# Patient Record
Sex: FEMALE | Race: WHITE | NOT HISPANIC OR LATINO | Employment: OTHER | URBAN - METROPOLITAN AREA
[De-identification: names, ages, dates, MRNs, and addresses within clinical notes are randomized per-mention and may not be internally consistent; named-entity substitution may affect disease eponyms.]

---

## 2023-10-30 DIAGNOSIS — M06.9 RHEUMATOID ARTHRITIS INVOLVING MULTIPLE SITES, UNSPECIFIED WHETHER RHEUMATOID FACTOR PRESENT (MULTI): Primary | ICD-10-CM

## 2023-10-30 RX ORDER — HYDROXYCHLOROQUINE SULFATE 200 MG/1
200 TABLET, FILM COATED ORAL DAILY
Qty: 90 TABLET | Refills: 2 | Status: SHIPPED | OUTPATIENT
Start: 2023-10-30 | End: 2023-11-20

## 2023-10-30 RX ORDER — HYDROXYCHLOROQUINE SULFATE 200 MG/1
1 TABLET, FILM COATED ORAL DAILY
COMMUNITY
End: 2023-10-30 | Stop reason: SDUPTHER

## 2023-11-19 DIAGNOSIS — M06.9 RHEUMATOID ARTHRITIS INVOLVING MULTIPLE SITES, UNSPECIFIED WHETHER RHEUMATOID FACTOR PRESENT (MULTI): ICD-10-CM

## 2023-11-20 RX ORDER — HYDROXYCHLOROQUINE SULFATE 200 MG/1
TABLET, FILM COATED ORAL DAILY
Qty: 90 TABLET | Refills: 3 | Status: SHIPPED | OUTPATIENT
Start: 2023-11-20 | End: 2024-05-14

## 2024-01-03 ENCOUNTER — TELEPHONE (OUTPATIENT)
Dept: RHEUMATOLOGY | Facility: CLINIC | Age: 74
End: 2024-01-03
Payer: MEDICARE

## 2024-01-03 NOTE — TELEPHONE ENCOUNTER
Patient called and would like you to call her about her medications, I offered her a virtual appointment and she declined it.

## 2024-01-06 PROBLEM — M81.0 OSTEOPOROSIS: Status: ACTIVE | Noted: 2024-01-06

## 2024-01-06 PROBLEM — M05.79: Status: ACTIVE | Noted: 2024-01-06

## 2024-01-06 PROBLEM — Z79.60 LONG-TERM USE OF IMMUNOSUPPRESSANT MEDICATION: Status: ACTIVE | Noted: 2024-01-06

## 2024-01-06 PROBLEM — M25.519 SHOULDER PAIN: Status: ACTIVE | Noted: 2024-01-06

## 2024-01-06 PROBLEM — E55.9 VITAMIN D DEFICIENCY: Status: ACTIVE | Noted: 2024-01-06

## 2024-01-06 NOTE — PROGRESS NOTES
Subjective   Patient ID: Celine Combs is a 73 y.o. female who presents for Rheumatoid Arthritis and Osteoarthritis (FUV- pt would to discuss meds. Taking Plaquenil 200 mg daily. She c/o stiffness on and off. ).    HPI  Last seen Sept 2023  â€¢    s/p hand surgery, wrist replacement  â€¢    s/p right shoulder replacement  â€¢    OA right knee s/p Right TKA '10  â€¢    Vit D def  â€¢    Osteoporosis T score hip -2.8, 2010- intolerant of oral bisphosphonates. Reclast recommended 2010, but she did not have. Treatment again recommended 2015  â€¢    Ovarian Cancer s/p hyst, Chemo Tx, No XRT  â€¢    History of shingles  . TKR R October 2017    L shoulder TSR dr. Calvillo [facility]   Still off and on does not take  All over knee hip   Walks a lot  Stiff only occ     On plaq gets yearly eye exam    Does not want meds for Osteoporosis    PAIN:  SWELLING:   AM GEL:  SIDE EFFECTS OF MED:    LAST LAB  Lab Results   Component Value Date    CRP 0.58 03/31/2022     Labs sept 23 [facility]  TSH 5.5  Cbc pre op normal  Cmp normal     PHYSICAL EXAM  NODES   HEART  LUNGS  ABDOMEN   VASCULAR  NEURO   SKIN  JOINTS chronic deformities throughout from rheumatoid arthritis specifically her hands  There is currently no information documented on the homunculus. Go to the Rheumatology activity and complete the homunculus joint exam.   Assessment/Plan   Diagnoses and all orders for this visit:  Long-term use of immunosuppressant medication  Osteoporosis, unspecified osteoporosis type, unspecified pathological fracture presence  Vitamin D deficiency  Rheumatoid arthritis, seropositive, multiple sites (CMS/[facility])  Shoulder pain, unspecified chronicity, unspecified laterality    This is a periodic follow-up of rheumatoid arthritis  She has complaints of intermittent stiffness in several joints but no evidence for any active synovitis without any swelling excetra  She continues with Plaquenil 200 mg daily  She asked if there is anything for stiffness of her  neck and other parts of her body but I answered known she needs to continue to do some stretching exercises.  She will discuss this with her physical therapist that she had a total shoulder replacement by Dr. Calvillo at [facility] [place].  Her lab work was within normal limits preshoulder replacement in October 2023 she will be visiting her PCP Dr. Uriarte as he wants to see her because her TSH is 5.5.  I will see her back in 1 year for continued follow-up.  Her eye examinations are up-to-date

## 2024-01-08 ENCOUNTER — OFFICE VISIT (OUTPATIENT)
Dept: RHEUMATOLOGY | Facility: CLINIC | Age: 74
End: 2024-01-08
Payer: MEDICARE

## 2024-01-08 VITALS
BODY MASS INDEX: 17.56 KG/M2 | WEIGHT: 96 LBS | HEART RATE: 101 BPM | DIASTOLIC BLOOD PRESSURE: 81 MMHG | SYSTOLIC BLOOD PRESSURE: 130 MMHG

## 2024-01-08 DIAGNOSIS — Z79.60 LONG-TERM USE OF IMMUNOSUPPRESSANT MEDICATION: Primary | ICD-10-CM

## 2024-01-08 DIAGNOSIS — M81.0 OSTEOPOROSIS, UNSPECIFIED OSTEOPOROSIS TYPE, UNSPECIFIED PATHOLOGICAL FRACTURE PRESENCE: ICD-10-CM

## 2024-01-08 DIAGNOSIS — M25.519 SHOULDER PAIN, UNSPECIFIED CHRONICITY, UNSPECIFIED LATERALITY: ICD-10-CM

## 2024-01-08 DIAGNOSIS — M05.79 RHEUMATOID ARTHRITIS, SEROPOSITIVE, MULTIPLE SITES (MULTI): ICD-10-CM

## 2024-01-08 DIAGNOSIS — E55.9 VITAMIN D DEFICIENCY: ICD-10-CM

## 2024-01-08 PROCEDURE — 1036F TOBACCO NON-USER: CPT | Performed by: INTERNAL MEDICINE

## 2024-01-08 PROCEDURE — 99214 OFFICE O/P EST MOD 30 MIN: CPT | Performed by: INTERNAL MEDICINE

## 2024-01-08 PROCEDURE — 1159F MED LIST DOCD IN RCRD: CPT | Performed by: INTERNAL MEDICINE

## 2024-01-08 PROCEDURE — 1126F AMNT PAIN NOTED NONE PRSNT: CPT | Performed by: INTERNAL MEDICINE

## 2024-01-08 RX ORDER — MONTELUKAST SODIUM 10 MG/1
TABLET ORAL
COMMUNITY

## 2024-01-08 RX ORDER — CHOLECALCIFEROL (VITAMIN D3) 1250 MCG
1 TABLET ORAL
COMMUNITY
Start: 2017-02-09 | End: 2024-01-08 | Stop reason: ALTCHOICE

## 2024-01-08 RX ORDER — FOLIC ACID 1 MG/1
1 TABLET ORAL DAILY
COMMUNITY
Start: 2013-09-12 | End: 2024-01-08 | Stop reason: ALTCHOICE

## 2024-01-08 RX ORDER — LEVOTHYROXINE SODIUM 75 UG/1
1 TABLET ORAL DAILY
COMMUNITY
Start: 2020-11-06

## 2024-05-14 DIAGNOSIS — M06.9 RHEUMATOID ARTHRITIS INVOLVING MULTIPLE SITES, UNSPECIFIED WHETHER RHEUMATOID FACTOR PRESENT (MULTI): ICD-10-CM

## 2024-05-14 RX ORDER — HYDROXYCHLOROQUINE SULFATE 200 MG/1
TABLET, FILM COATED ORAL DAILY
Qty: 90 TABLET | Refills: 2 | Status: SHIPPED | OUTPATIENT
Start: 2024-05-14

## 2025-01-05 NOTE — PROGRESS NOTES
Subjective   Patient ID: Celine Combs is a 74 y.o. female who presents for No chief complaint on file..    HPI  Last seen JAN 2024 1 YEAR AGO     â€¢    s/p hand surgery, wrist replacement  â€¢    s/p right shoulder replacement  â€¢    OA right knee s/p Right TKA '10  â€¢    Vit D def  â€¢    Osteoporosis T score hip -2.8, 2010- intolerant of oral bisphosphonates. Reclast recommended 2010, but she did not have. Treatment again recommended 2015  â€¢    Ovarian Cancer s/p hyst, Chemo Tx, No XRT  â€¢    History of shingles  . TKR R October 2017    L shoulder TSR dr. Calvillo [facility]   Still off and on does not take  All over knee hip   Walks a lot  Stiff only occ     Pt helps neck pain  At [facility]     On plaq gets yearly eye exam    Does not want meds for Osteoporosis    PAIN:  SWELLING:   AM GEL:  SIDE EFFECTS OF MED:    LAST LAB  Lab Results   Component Value Date    CRP 0.58 03/31/2022     Labs sept 23 [facility]  TSH 5.5  Cbc pre op normal  Cmp normal     PHYSICAL EXAM  NODES   HEART  LUNGS  ABDOMEN   VASCULAR  NEURO   SKIN  JOINTS chronic deformities throughout from rheumatoid arthritis specifically her hands  There is currently no information documented on the homunculus. Go to the Rheumatology activity and complete the homunculus joint exam.   Assessment/Plan   There are no diagnoses linked to this encounter.    This is a periodic follow-up of rheumatoid arthritis  She has complaints of intermittent stiffness in several joints but no evidence for any active synovitis without any swelling excetra  She continues with Plaquenil 200 mg daily  She asked if there is anything for stiffness of her neck and other parts of her body but I answered known she needs to continue to do some stretching exercises.  She will discuss this with her physical therapist that she had a total shoulder replacement by Dr. Calvillo at [facility].  Her lab work was within normal limits preshoulder replacement in October  2023 she will be visiting her PCP Dr. Uriarte as he wants to see her because her TSH is 5.5.  I will see her back in 1 year for continued follow-up.  Her eye examinations are up-to-date

## 2025-01-08 ENCOUNTER — APPOINTMENT (OUTPATIENT)
Dept: RHEUMATOLOGY | Facility: CLINIC | Age: 75
End: 2025-01-08
Payer: MEDICARE

## 2025-01-08 VITALS
OXYGEN SATURATION: 94 % | SYSTOLIC BLOOD PRESSURE: 119 MMHG | WEIGHT: 96.2 LBS | HEART RATE: 81 BPM | BODY MASS INDEX: 17.05 KG/M2 | HEIGHT: 63 IN | DIASTOLIC BLOOD PRESSURE: 77 MMHG

## 2025-01-08 DIAGNOSIS — E55.9 VITAMIN D DEFICIENCY: ICD-10-CM

## 2025-01-08 DIAGNOSIS — M81.0 OSTEOPOROSIS, UNSPECIFIED OSTEOPOROSIS TYPE, UNSPECIFIED PATHOLOGICAL FRACTURE PRESENCE: ICD-10-CM

## 2025-01-08 DIAGNOSIS — M05.79 RHEUMATOID ARTHRITIS, SEROPOSITIVE, MULTIPLE SITES (MULTI): Primary | ICD-10-CM

## 2025-01-08 PROCEDURE — 1036F TOBACCO NON-USER: CPT | Performed by: INTERNAL MEDICINE

## 2025-01-08 PROCEDURE — 3008F BODY MASS INDEX DOCD: CPT | Performed by: INTERNAL MEDICINE

## 2025-01-08 PROCEDURE — 99214 OFFICE O/P EST MOD 30 MIN: CPT | Performed by: INTERNAL MEDICINE

## 2025-01-08 PROCEDURE — 1159F MED LIST DOCD IN RCRD: CPT | Performed by: INTERNAL MEDICINE

## 2025-05-28 ENCOUNTER — APPOINTMENT (OUTPATIENT)
Dept: DERMATOLOGY | Facility: CLINIC | Age: 75
End: 2025-05-28
Payer: MEDICARE

## 2025-05-28 VITALS — DIASTOLIC BLOOD PRESSURE: 78 MMHG | HEART RATE: 92 BPM | SYSTOLIC BLOOD PRESSURE: 126 MMHG

## 2025-05-28 DIAGNOSIS — C44.320 SQUAMOUS CELL CARCINOMA OF SKIN OF FACE: Primary | ICD-10-CM

## 2025-05-28 PROCEDURE — 99204 OFFICE O/P NEW MOD 45 MIN: CPT | Performed by: DERMATOLOGY

## 2025-05-28 PROCEDURE — 14041 TIS TRNFR F/C/C/M/N/A/G/H/F: CPT | Performed by: DERMATOLOGY

## 2025-05-28 PROCEDURE — 17311 MOHS 1 STAGE H/N/HF/G: CPT | Performed by: DERMATOLOGY

## 2025-05-28 NOTE — PROGRESS NOTES
Mohs Surgery Operative Note    Date of Surgery:  5/28/2025  Surgeon:  August Wills MD  Office Location: [address]  [facility] [address]  Dept: [phone]  Dept Fax: [phone]  Referring Provider: Lesvia Wynn MD  [address] [facility] [address]  [place] [address]      Assessment/Plan   Pre-procedure:   Obtained informed consent: written from patient  The surgical site was identified and confirmed with the patient.     Intra-operative:   Audible time out called at : 8:20AM 05/28/25  by: Sarina Babin MA   Verified patient name, birthdate, site, specimen bottle label & requisition.    The planned procedure(s) was again reviewed with the patient. The risks of bleeding, infection, nerve damage and scarring were reviewed. Written authorization was obtained. The patient identity, surgical site, and planned procedure(s) were verified. The provider acted as both surgeon and pathologist.     SQUAMOUS CELL CARCINOMA OF SKIN  Above Right Medial Eyebrow  Mohs surgery    Consent obtained: written    Universal Protocol:  Procedure explained and questions answered to patient or proxy's satisfaction: Yes    Test results available and properly labeled: Yes    Pathology report reviewed: Yes    External notes reviewed: Yes    Photo or diagram used for site identification: Yes    Site/side marked: Yes    Slide independently reviewed by Mohs surgeon: Yes    Immediately prior to procedure a time out was called: Yes    Patient identity confirmed: verbally with patient  Preparation: Patient was prepped and draped in usual sterile fashion      Anticoagulation:  Is the patient taking prescription anticoagulant and/or aspirin prescribed/recommended by a physician? No    Was the anticoagulation regimen changed prior to Mohs? No      Anesthesia:  Anesthesia method: local infiltration  Local anesthetic: lidocaine 1% WITH epi    Procedure Details:  Case ID Number:  [id number]  Biopsy accession number: (outside path)  Date of biopsy: 3/27/2025  Pre-Op diagnosis: squamous cell carcinoma  Surgical site (from skin exam): Above Right Medial Eyebrow  Pre-operative length (cm): 0.9  Pre-operative width (cm): 0.9  Indications for Mohs surgery: anatomic location where tissue conservation is critical  Previously treated? No      Micrographic Surgery Details:  Post-operative length (cm): 1.1  Post-operative width (cm): 1  Number of Mohs stages: 1    Stage 1     Comments: The patient was brought into the operating room and placed in the procedure chair in the appropriate position.  The area positive by previous biopsy was identified and confirmed with the patient. The area of clinically obvious tumor was debulked using a curette and/or scalpel as needed. An incision was made following the Mohs approach through the skin. The specimen was taken to the lab, divided into 2 piece(s) and appropriately chromacoded and processed.     Tumor features identified on Mohs section: no tumor identified    Depth of defect: subcutaneous fat    Patient tolerance of procedure: tolerated well, no immediate complications    Reconstruction:  Was the defect reconstructed? Yes    Was reconstruction performed by the same Mohs surgeon? Yes    When was reconstruction performed? same day  Type of reconstruction: flap  Type of flap: advancement    Advancement flap type: bilateral double arm  Flap area (cm2): 15  Subcutaneous Layers (Deep Stitches)   Suture size:  5-0  Suture type:  Vicryl  Stitches:  Buried vertical mattress  Fine/surface layer approximation (top stitches)   Epidermal/Superficial suture size:  5-0  Epidermal/Superficial suture type:  Fast-absorbing gut  Stitches: simple running    Hemostasis achieved with: suture, pressure and electrodesiccation  Outcome: patient tolerated procedure well with no complications    Post-procedure details: sterile dressing applied and wound care instructions given     Dressing type: pressure dressing        Advancement Flap:  Due to geometric and functional constraints, a flap reconstruction was performed to reconstruct the defect. To that end, adjacent tissue was incised and carried over to close the defect in the following manner: Advancement flap Using skin marking ink, an advancement flap was designed to repair the defect and minimize functional and cosmetic distortion. Given the size, depth, and location of the defect, the surrounding structures and local tissue laxity, it was felt that an advancement flap was necessary to provide the best restoration of normal anatomy and function of the skin. The risks, benefits and likely outcome of the flap were discussed. The wound edges were refreshed to a 90 degree angle. The flap was cut and undermined extensively at the level of the subcutaneous plane. Standing cutaneous cones were removed using Burow's triangles. The deep tissue was elevated and the flap was advanced into position to close the primary defect using multiple key deep sutures. The remainder of the flap was then affixed with epidermal sutures. The flap measured 4.7 x 3.2 cm2.       The final repair measured 4.7 x 3.2 cm              Wound care was discussed, and the patient was given written post-operative wound care instructions.      The patient will follow up with August Wills MD as needed for any post operative problems or concerns, and will follow up with their primary dermatologist as scheduled.       Sarina ALEJANDRA MA   am scribing for, and in the presence of August Wills MD

## 2025-05-28 NOTE — PROGRESS NOTES
Office Visit Note  Date: 5/28/2025  Surgeon:  August Wills MD  Office Location:  [address]   [address]  [facility] [address]  Dept: [phone]  Dept Fax: [phone]  Referring Provider: Lesvia Wynn MD  [address]  [address]  [place] [address]    Subjective   Celine Combs is a 74 y.o. female who presents for the following: MOHS Surgery (Above Right Medial Eyebrow-SCC)    According to the patient, the lesion has been present for approximately 6 months at the time of diagnosis.  The lesion is not causing symptoms.  The lesion has not been treated previously.    The patient does not have a pacemaker / defibrillator.  The patient does have a heart valve / joint replacement.    The patient is not on blood thinners.  The patient does not have a history of hepatitis B or C.  The patient does not have a history of HIV.  The patient does not have a history of immunosuppression (e.g. organ transplantation, malignancy, medications)    Review of Systems:  No other skin or systemic complaints other than what is documented elsewhere in the note.    MEDICAL HISTORY: clinically relevant history including significant past medical history, medications and allergies was reviewed and documented in Epic.    Objective   Well appearing patient in no apparent distress; mood and affect are within normal limits.  Vital signs: See record.  Noted on the   Above Right Medial Eyebrow  Is a 0.9 x 0.9 cm scar    The patient confirmed the identified site.    Discussion:  The nature of the diagnosis was explained. The lesion is a skin cancer.  It has a risk of local growth and distant spread. The condition is associated with sun exposure.  Warning signs of non-melanoma skin cancer discussed. Patient was instructed to perform monthly self skin examination.  We recommended that the patient have regular full skin exams given an increased risk of subsequent skin cancers. The patient  was instructed to use sun protective behaviors including use of broad spectrum sunscreens and sun protective clothing to reduce risk of skin cancers.      Risks, benefits, side effects of Mohs surgery were discussed with patient and the patient voiced understanding.  It was explained that even though the cure rate of Mohs is very high it is not 100%. Risks of surgery including but not limited to bleeding, infection, numbness, nerve damage, and scar were reviewed.  Discussion included wound care requirements, activity restrictions, likely scar outcome and time to heal.     After Mohs surgery, the defect may need to be repaired surgically and the scar may be longer than the original lesion.  Reconstruction options, risks, and benefits were reviewed including second intention healing, linear repair (4-1 ratio was explained), local flaps, skin grafts, cartilage grafts and interpolation flaps (the need for multiple surgeries was explained). Possible outcomes were reviewed including likely scar appearance, failure of flap survival, infection, bleeding and the need for revision surgery.     The pathology was reviewed, the photograph was reviewed, and the referring physician's note was reviewed.    Patient elected for Mohs surgery.  The patient has a basal cell carcinoma.  The pathology was reviewed, the photograph was reviewed, and the referring physicians note was reviewed.  Multiple treatment options including mohs surgery (which has moderate risk of morbidity) were reviewed.    Medical Decision Making  Column 1- Basal Cell Carcinoma (1 acute uncomplicated illness- Low)  Column 2- 3 tests reviewed (pathology, photograph, refering physician notes- Moderate)  Column 3- Modertate risk of morbidity from additional treatment- mohs surgery- Moderate)    Overall Moderate MDM       I, Sarina Babin MA   am scribing for, and in the presence of August Wills MD

## 2025-05-29 DIAGNOSIS — M06.9 RHEUMATOID ARTHRITIS INVOLVING MULTIPLE SITES, UNSPECIFIED WHETHER RHEUMATOID FACTOR PRESENT (MULTI): ICD-10-CM

## 2025-05-29 RX ORDER — HYDROXYCHLOROQUINE SULFATE 200 MG/1
200 TABLET, FILM COATED ORAL DAILY
Qty: 90 TABLET | Refills: 3 | Status: SHIPPED | OUTPATIENT
Start: 2025-05-29

## 2025-05-29 NOTE — TELEPHONE ENCOUNTER
Prescription Request for Hydroxychloroquine        Has The Patient Been Identified By Name And Date Of Birth: yes    RX Requestor: patient     Date of Last Refill: 5/14/24    Date Of Last Office Visit: 1/8/25    Date Of Future Office Visit: NONE

## 2025-10-15 ENCOUNTER — APPOINTMENT (OUTPATIENT)
Dept: RHEUMATOLOGY | Facility: CLINIC | Age: 75
End: 2025-10-15
Payer: MEDICARE